# Patient Record
Sex: FEMALE | Race: WHITE
[De-identification: names, ages, dates, MRNs, and addresses within clinical notes are randomized per-mention and may not be internally consistent; named-entity substitution may affect disease eponyms.]

---

## 2017-03-07 ENCOUNTER — HOSPITAL ENCOUNTER (OUTPATIENT)
Dept: HOSPITAL 80 - FSGY | Age: 52
Discharge: HOME | End: 2017-03-07
Payer: COMMERCIAL

## 2017-03-07 DIAGNOSIS — M20.11: Primary | ICD-10-CM

## 2017-03-07 PROCEDURE — 28296 COR HLX VLGS DSTL MTAR OSTEO: CPT

## 2017-03-07 PROCEDURE — 73630 X-RAY EXAM OF FOOT: CPT

## 2017-03-07 PROCEDURE — 0QSN04Z REPOSITION RIGHT METATARSAL WITH INTERNAL FIXATION DEVICE, OPEN APPROACH: ICD-10-PCS | Performed by: PODIATRIST

## 2017-03-07 PROCEDURE — C1713 ANCHOR/SCREW BN/BN,TIS/BN: HCPCS

## 2017-03-07 PROCEDURE — C1769 GUIDE WIRE: HCPCS

## 2017-03-07 NOTE — GOP
DATE OF OPERATION:  03/07/2017



SURGEON:  Ashlyn Vaughn DPM



ASSISTANT:  Desirae Vaughn DPM



ANESTHESIA:  Light general/MAC.



ANESTHESIOLOGIST:  Dr. Humberto Cates MD



PREOPERATIVE DIAGNOSIS:  Hallux abductovalgus bunion deformity, right foot.



POSTOPERATIVE DIAGNOSIS:  

1.  Hallux abductovalgus bunion deformity, right foot.

2.  Benign soft tissue mass, suspecting ganglion cyst, right foot.

3.  Osteochondral defect, central plantar aspect 1st metatarsal head, right 
foot.



PROCEDURE PERFORMED: 

1. Hallux valgus repair involving bunionectomy distal 1st metatarsal Gloria 
type of osteotomy with screw fixation, right foot.

2. Excision of soft tissue mass medial capsule first met head right foot.

3. Excision of osteocondral defect, subchondral drilling first met head right 
foot.



ESTIMATED BLOOD LOSS:  Less than 5 cc.



INDICATIONS:  Painful bunion deformity for several years progressively getting 
worse. The patient's mother and sister both have had foot surgery for their 
bunions. She reports having bunion surgery 20 years ago on the left foot , had 
a lot of pain and nausea postop, but happy with outcome. 

At this time, she elects to proceed with surgery on the right foot.



DESCRIPTION OF PROCEDURE:  The patient was brought to the operating room, 
placed on the operating table in a supine position. Intravenous sedation 
administered by the anesthesiologist. A posterior tibial and peripheral nerve 
block was obtained utilizing 6 cc of 0.5% Marcaine plain, 6 cc of 0.2% 
ropivacaine, and 6 cc of 2% Xylocaine plain. The lower extremity was prepped 
and draped in the usual sterile manner. After the limb had been elevated, it 
was exsanguinated with an Esmarch bandage. An ankle tourniquet was inflated to 
230 mmHg, and the procedure was begun. Webril padding utilized under the ankle 
cuff. 



Attention was directed toward the dorsal medial aspect of the 1st 
metatarsophalangeal joint where a linear incision was created. Incision was 
carefully deepened with care of neurovascular structures and clamp and 
cauterize bleeders. Linear capsulotomy performed, exposing hypertrophied medial 
eminence. A small section of the medial eminence was resected. This was 
resected with a sagittal saw. There was an osteochondral defect in the central 
plantar aspect of the metatarsal head, measuring approximately 2 x 3 mm in size
, loose flap of cartilage and frayed. This was dissected, and the site was 
drilled with a .35 K-wire to promote subchondral bone bleeding. 



Attention was directed toward the more lateral aspect of the joint, where the 
incision was deepened. Tight adductor tendon identified and released.  Less of 
a lateral type pull noted on the hallux at this time. The extensor hallucis 
brevis was also identified and released. Attention was redirected towards the 
1st metatarsal head where utilizing K-wire as an axis guide and a Tobias 
osteotomy guide, a long dorsal arm and shorter plantar arm was created, Gloria 
type of osteotomy. Capital fragment was shifted over laterally by 6-7 mm and 
impacted onto the shaft medially. Temporary fixation achieved with K-wires. 
Fixation of the osteotomy achieved with OsteoMed headless screws 2.4, one 
measuring 16 mm in length, the other one 14 mm in length. Temporary fixation 
was removed. The osteotomy was noted to be stable and congruent. C-arm pictures 
were taken throughout the procedure to verify alignment and positioning. The 
remaining medial metaphyseal shelf resected with a sagittal saw.  Site smoothed 
with a rotating bur until no sharp edges were noted. With loading of the 
forefoot, satisfactory alignment noted of the 1st metatarsal and hallux, and it 
was decided that an Akin osteotomy was not necessary. 



Attention was directed toward the medial capsule, where there was some 
redundant tissue to be resected, and when creating a T-type capsulotomy, 
approximately size of two peas of clear jelly-like fluid was drained, and 
within the capsule a cystic type structure noted with yellow seeds. This was 
resected and sent to pathology for gross and microscopic examination, appearing 
benign. Once the pathologic tissue was resected, the wound was copiously 
irrigated with bacitracin irrigation solution. 



Capsular closure achieved with 2-0 and 3-0 Vicryl. Ankle tourniquet was released
, and a normal hyperemic response was noted to all digits. Subcutaneous closure 
achieved with 4-0 Monocryl. Skin was closed with 4-0 Prolene in a horizontal 
and interrupted suture manner. Dressings included Xeroform, 4 x 4's, fluffs, 
reinforced with Juan Daniel, tape, and an Ace bandage. 



The patient tolerated the procedure and anesthesia well and left the operating 
room with vital signs stable and vascular status intact to all digits. There 
were no intraoperative complications. In postoperative recovery, she was doing 
very well.  Patient's  will be providing transportation home. She was 
fitted with a Cryo Cuff.  Prognosis good.



ADDITIONAL INJECTABLES:  Included 8 cc of 0.25% Marcaine plain for additional 
postoperative anesthesia.



PATHOLOGY:  Soft tissue specimens sent for gross and microscopic examination.





Job #:  [id number]

MTDD

## 2017-10-05 ENCOUNTER — HOSPITAL ENCOUNTER (OUTPATIENT)
Dept: HOSPITAL 80 - FIMAGING | Age: 52
End: 2017-10-05
Attending: OBSTETRICS & GYNECOLOGY
Payer: COMMERCIAL

## 2017-10-05 DIAGNOSIS — Z12.31: Primary | ICD-10-CM

## 2017-10-05 PROCEDURE — G0202 SCR MAMMO BI INCL CAD: HCPCS

## 2018-12-17 ENCOUNTER — HOSPITAL ENCOUNTER (OUTPATIENT)
Dept: HOSPITAL 80 - FIMAGING | Age: 53
End: 2018-12-17
Attending: OBSTETRICS & GYNECOLOGY
Payer: COMMERCIAL

## 2018-12-17 DIAGNOSIS — Z12.31: Primary | ICD-10-CM
